# Patient Record
Sex: FEMALE | URBAN - METROPOLITAN AREA
[De-identification: names, ages, dates, MRNs, and addresses within clinical notes are randomized per-mention and may not be internally consistent; named-entity substitution may affect disease eponyms.]

---

## 2018-03-22 ENCOUNTER — APPOINTMENT (RX ONLY)
Dept: URBAN - METROPOLITAN AREA CLINIC 81 | Facility: CLINIC | Age: 48
Setting detail: DERMATOLOGY
End: 2018-03-22

## 2018-03-22 DIAGNOSIS — Z41.9 ENCOUNTER FOR PROCEDURE FOR PURPOSES OTHER THAN REMEDYING HEALTH STATE, UNSPECIFIED: ICD-10-CM

## 2018-03-22 PROCEDURE — ? BOTOX

## 2018-03-22 PROCEDURE — ? DIAGNOSIS COMMENT

## 2018-03-22 NOTE — PROCEDURE: BOTOX
Price (Use Numbers Only, No Special Characters Or $): 914 Price (Use Numbers Only, No Special Characters Or $): 749

## 2018-03-22 NOTE — PROCEDURE: DIAGNOSIS COMMENT
Comment: Discussed laser. $400 for brown spots and a few broken caps around nose.
Detail Level: Simple

## 2018-07-09 ENCOUNTER — APPOINTMENT (RX ONLY)
Dept: URBAN - METROPOLITAN AREA CLINIC 81 | Facility: CLINIC | Age: 48
Setting detail: DERMATOLOGY
End: 2018-07-09

## 2018-07-09 DIAGNOSIS — Z41.9 ENCOUNTER FOR PROCEDURE FOR PURPOSES OTHER THAN REMEDYING HEALTH STATE, UNSPECIFIED: ICD-10-CM

## 2018-07-09 PROCEDURE — ? BOTOX

## 2018-07-09 NOTE — PROCEDURE: BOTOX
Price (Use Numbers Only, No Special Characters Or $): 698 Price (Use Numbers Only, No Special Characters Or $): 007

## 2019-03-08 ENCOUNTER — APPOINTMENT (RX ONLY)
Dept: URBAN - METROPOLITAN AREA CLINIC 81 | Facility: CLINIC | Age: 49
Setting detail: DERMATOLOGY
End: 2019-03-08

## 2019-03-08 DIAGNOSIS — Z41.9 ENCOUNTER FOR PROCEDURE FOR PURPOSES OTHER THAN REMEDYING HEALTH STATE, UNSPECIFIED: ICD-10-CM

## 2019-03-08 PROCEDURE — ? ADDITIONAL NOTES

## 2019-03-08 PROCEDURE — ? BOTOX

## 2019-03-08 NOTE — PROCEDURE: ADDITIONAL NOTES
Detail Level: Detailed
Additional Notes: Filler \\nVollure 1 syringe mouth lines 650 wob\\nVoluma  1 syringe cheeks 700 wob

## 2019-03-08 NOTE — PROCEDURE: BOTOX
Price (Use Numbers Only, No Special Characters Or $): 088 Price (Use Numbers Only, No Special Characters Or $): 142

## 2019-07-24 ENCOUNTER — APPOINTMENT (RX ONLY)
Dept: URBAN - METROPOLITAN AREA CLINIC 81 | Facility: CLINIC | Age: 49
Setting detail: DERMATOLOGY
End: 2019-07-24

## 2019-07-24 DIAGNOSIS — Z41.9 ENCOUNTER FOR PROCEDURE FOR PURPOSES OTHER THAN REMEDYING HEALTH STATE, UNSPECIFIED: ICD-10-CM

## 2019-07-24 PROCEDURE — ? BOTOX

## 2019-07-24 NOTE — PROCEDURE: BOTOX
Price (Use Numbers Only, No Special Characters Or $): 109 Price (Use Numbers Only, No Special Characters Or $): 808

## 2020-08-26 ENCOUNTER — APPOINTMENT (RX ONLY)
Dept: URBAN - METROPOLITAN AREA CLINIC 81 | Facility: CLINIC | Age: 50
Setting detail: DERMATOLOGY
End: 2020-08-26

## 2020-08-26 DIAGNOSIS — Z41.9 ENCOUNTER FOR PROCEDURE FOR PURPOSES OTHER THAN REMEDYING HEALTH STATE, UNSPECIFIED: ICD-10-CM

## 2020-08-26 PROCEDURE — ? BOTOX

## 2020-08-26 NOTE — PROCEDURE: BOTOX
Price (Use Numbers Only, No Special Characters Or $): 444 Price (Use Numbers Only, No Special Characters Or $): 881

## 2021-10-16 ENCOUNTER — APPOINTMENT (RX ONLY)
Dept: URBAN - METROPOLITAN AREA CLINIC 81 | Facility: CLINIC | Age: 51
Setting detail: DERMATOLOGY
End: 2021-10-16

## 2021-10-16 DIAGNOSIS — L82.0 INFLAMED SEBORRHEIC KERATOSIS: ICD-10-CM

## 2021-10-16 DIAGNOSIS — L81.4 OTHER MELANIN HYPERPIGMENTATION: ICD-10-CM

## 2021-10-16 DIAGNOSIS — L57.8 OTHER SKIN CHANGES DUE TO CHRONIC EXPOSURE TO NONIONIZING RADIATION: ICD-10-CM

## 2021-10-16 PROBLEM — D48.5 NEOPLASM OF UNCERTAIN BEHAVIOR OF SKIN: Status: ACTIVE | Noted: 2021-10-16

## 2021-10-16 PROCEDURE — ? BIOPSY BY SHAVE METHOD

## 2021-10-16 PROCEDURE — ? COUNSELING

## 2021-10-16 PROCEDURE — ? SUNSCREEN RECOMMENDATIONS

## 2021-10-16 PROCEDURE — 11102 TANGNTL BX SKIN SINGLE LES: CPT

## 2021-10-16 PROCEDURE — 99213 OFFICE O/P EST LOW 20 MIN: CPT | Mod: 25

## 2021-10-16 ASSESSMENT — LOCATION SIMPLE DESCRIPTION DERM
LOCATION SIMPLE: CHEST
LOCATION SIMPLE: LEFT UPPER BACK
LOCATION SIMPLE: LEFT POSTERIOR UPPER ARM

## 2021-10-16 ASSESSMENT — LOCATION DETAILED DESCRIPTION DERM
LOCATION DETAILED: LEFT DISTAL POSTERIOR UPPER ARM
LOCATION DETAILED: LEFT SUPERIOR MEDIAL UPPER BACK
LOCATION DETAILED: LEFT MEDIAL SUPERIOR CHEST

## 2021-10-16 ASSESSMENT — LOCATION ZONE DERM
LOCATION ZONE: ARM
LOCATION ZONE: TRUNK

## 2021-10-16 NOTE — PROCEDURE: BIOPSY BY SHAVE METHOD
Detail Level: Detailed
Depth Of Biopsy: dermis
Was A Bandage Applied: Yes
Size Of Lesion In Cm: 2.3
X Size Of Lesion In Cm: 0
Biopsy Type: H and E
Biopsy Method: 15 blade
Anesthesia Type: 1% lidocaine with epinephrine
Anesthesia Volume In Cc (Will Not Render If 0): 0.5
Hemostasis: Aluminum Chloride and Electrocautery
Wound Care: Petrolatum
Dressing: bandage
Destruction After The Procedure: No
Type Of Destruction Used: Curettage
Curettage Text: The wound bed was treated with curettage after the biopsy was performed.
Cryotherapy Text: The wound bed was treated with cryotherapy after the biopsy was performed.
Electrodesiccation Text: The wound bed was treated with electrodesiccation after the biopsy was performed.
Electrodesiccation And Curettage Text: The wound bed was treated with electrodesiccation and curettage after the biopsy was performed.
Silver Nitrate Text: The wound bed was treated with silver nitrate after the biopsy was performed.
Lab: 6
Lab Facility: [id number]
Consent: Written consent was obtained and risks were reviewed including but not limited to scarring, infection, bleeding, scabbing, incomplete removal, nerve damage and allergy to anesthesia.
Post-Care Instructions: I reviewed with the patient in detail post-care instructions. Patient is to keep the biopsy site covered first night, and then apply Vaseline twice daily until healed.
Notification Instructions: Patient will be notified of biopsy results if positive however, patient is welcome to call office if they would like to know their biopsy results.
Billing Type: Third-Party Bill
Information: Selecting Yes will display possible errors in your note based on the variables you have selected. This validation is only offered as a suggestion for you. PLEASE NOTE THAT THE VALIDATION TEXT WILL BE REMOVED WHEN YOU FINALIZE YOUR NOTE. IF YOU WANT TO FAX A PRELIMINARY NOTE YOU WILL NEED TO TOGGLE THIS TO 'NO' IF YOU DO NOT WANT IT IN YOUR FAXED NOTE.

## 2024-02-15 ENCOUNTER — APPOINTMENT (RX ONLY)
Dept: URBAN - METROPOLITAN AREA CLINIC 81 | Facility: CLINIC | Age: 54
Setting detail: DERMATOLOGY
End: 2024-02-15

## 2024-02-15 DIAGNOSIS — Z41.9 ENCOUNTER FOR PROCEDURE FOR PURPOSES OTHER THAN REMEDYING HEALTH STATE, UNSPECIFIED: ICD-10-CM

## 2024-02-15 PROCEDURE — ? BOTOX

## 2024-02-15 PROCEDURE — ? JUVEDERM VOLUMA XC INJECTION

## 2024-02-15 ASSESSMENT — LOCATION ZONE DERM: LOCATION ZONE: FACE

## 2024-02-15 ASSESSMENT — LOCATION DETAILED DESCRIPTION DERM: LOCATION DETAILED: LEFT CENTRAL MALAR CHEEK

## 2024-02-15 ASSESSMENT — LOCATION SIMPLE DESCRIPTION DERM: LOCATION SIMPLE: LEFT CHEEK

## 2024-02-15 NOTE — PROCEDURE: JUVEDERM VOLUMA XC INJECTION
Anesthesia Volume In Cc: 0.5
Marionette Lines Filler Volume In Cc: 0
Lot #: [id number]
Consent: Written consent obtained. Risks include but not limited to bruising, beading, irregular texture, ulceration, infection, allergic reaction, scar formation, incomplete augmentation, temporary nature, procedural pain.
Number Of Syringes (Required For Inventory): 2
Procedural Text: The filler was administered to the treatment areas noted above.
Map Statment: See Attach Map for Complete Details
Include Cannula Information In Note?: No
Post-Care Instructions: Patient instructed to apply ice to reduce swelling.
Detail Level: Detailed
Price (Use Numbers Only, No Special Characters Or $): 8625
Filler: Juvederm Voluma XC
Expiration Date (Month Year): 4-7-24
Additional Anesthesia Volume In Cc: 6
Anesthesia Type: 1% lidocaine with epinephrine

## 2024-02-15 NOTE — PROCEDURE: BOTOX
Show Additional Area 6: Yes
Levator Labii Superioris Units: 0
Price (Use Numbers Only, No Special Characters Or $): 760
Post-Care Instructions: Patient instructed to not lie down for 4 hours and limit physical activity for 24 hours. Patient instructed not to travel by airplane for 48 hours.
Consent: Written consent obtained. Risks include but not limited to lid/brow ptosis, bruising, swelling, diplopia, temporary effect, incomplete chemical denervation.
Detail Level: Detailed
Show Ucl Units: No
Lot #: [id number]
Incrementing Botox Units: By 0.5 Units
Dilution (U/0.1 Cc): 2.5
Forehead Units: 62
Detail Level: Detailed
Expiration Date (Month Year): 12/25

## 2024-12-11 ENCOUNTER — APPOINTMENT (OUTPATIENT)
Dept: URBAN - METROPOLITAN AREA CLINIC 81 | Facility: CLINIC | Age: 54
Setting detail: DERMATOLOGY
End: 2024-12-11

## 2024-12-11 DIAGNOSIS — Z41.9 ENCOUNTER FOR PROCEDURE FOR PURPOSES OTHER THAN REMEDYING HEALTH STATE, UNSPECIFIED: ICD-10-CM

## 2024-12-11 PROCEDURE — ? BOTOX

## 2024-12-11 PROCEDURE — ? COSMETIC CONSULTATION: COOLPEEL

## 2024-12-11 PROCEDURE — ? ADDITIONAL NOTES

## 2024-12-11 PROCEDURE — ? COSMETIC QUOTE

## 2024-12-11 ASSESSMENT — LOCATION DETAILED DESCRIPTION DERM: LOCATION DETAILED: INFERIOR MID FOREHEAD

## 2024-12-11 ASSESSMENT — LOCATION ZONE DERM: LOCATION ZONE: FACE

## 2024-12-11 ASSESSMENT — LOCATION SIMPLE DESCRIPTION DERM: LOCATION SIMPLE: INFERIOR FOREHEAD

## 2024-12-11 NOTE — PROCEDURE: COSMETIC QUOTE
Body Procedure 2 Price/Unit (In Dollars- Use Only Numbers And Decimals): 0.00
Body Procedure 1 Percentage Discount: 0
Laser 3 Price/Unit (In Dollars- Use Only Numbers And Decimals): 200
Laser 3: CoolPeel Chest
Facility Fee Units (Optional): 1
Apply Facility Fee: No
Laser 2: CoolPeel Neck
Laser 1 Price/Unit (In Dollars- Use Only Numbers And Decimals): 778
Include Sales Tax On Surgeon's Fees: Yes
Detail Level: Zone
Laser 1: CoolPeel Face
Laser 2 Price/Unit (In Dollars- Use Only Numbers And Decimals): 300

## 2024-12-11 NOTE — PROCEDURE: ADDITIONAL NOTES
Detail Level: Detailed
Additional Notes: Will honor shashank gilomre
Render Risk Assessment In Note?: no

## 2024-12-11 NOTE — PROCEDURE: BOTOX
Show Additional Area 6: Yes
Levator Labii Superioris Units: 0
Price (Use Numbers Only, No Special Characters Or $): 322.33
Post-Care Instructions: Patient instructed to not lie down for 4 hours and limit physical activity for 24 hours. Patient instructed not to travel by airplane for 48 hours.
Consent: Written consent obtained. Risks include but not limited to lid/brow ptosis, bruising, swelling, diplopia, temporary effect, incomplete chemical denervation.
Show Ucl Units: No
Lot #: [id number]
Incrementing Botox Units: By 0.5 Units
Dilution (U/0.1 Cc): 2.5
Forehead Units: 62
Detail Level: Detailed
Expiration Date (Month Year): 11/26

## 2025-03-03 ENCOUNTER — APPOINTMENT (OUTPATIENT)
Dept: URBAN - METROPOLITAN AREA CLINIC 81 | Facility: CLINIC | Age: 55
Setting detail: DERMATOLOGY
End: 2025-03-03